# Patient Record
(demographics unavailable — no encounter records)

---

## 2020-07-13 NOTE — NUR
DUANE WARD admitted to room 407-1, with an admitting diagnosis of CELLULITUS/HYPOXIA 
, on 07/13/20 from AM via BED, accompanied by STAFF. DUANE WARD introduced to 
surroundings, call light, bed controls, phone, TV, temperature control, lights, meal times, 
smoking policy, visitor policy, side rail policy, bathrooms and showers.  Patient Rights 
given to patient in the handbook. DUANE WARD verbalizes understanding that [facility] is not responsible for the loss or damage to any personal effects or valuables that 
are kept in the patients posession during their hospitalization.  DUANE WARD 
verbalizes understanding of Interdisciplinary Patient Education. Patient and/or family were 
informed about the Rapid Response Team and its purpose.

## 2020-07-13 NOTE — ED RESPIRATORY
General


Chief Complaint:  Respiratory Problems


Stated Complaint:  SOA


Source:  patient, EMS, nursing home records


Exam Limitations:  no limitations





History of Present Illness


Date Seen by Provider:  2020


Time Seen by Provider:  05:57


Initial Comments


Patient arrives to ER by EMS from [facility], [place] with chief 

complaint of decreased oxygen saturations noted on morning vital signs. She was 

not started feeling short of breath having any cough fevers chills but said she 

felt a little foggy this morning. She does not wear oxygen routinely. On room 

air she was 87 percent and on 6 L nasal cannula per EMS she was 100% she does 

not have any known pulmonary disease. 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 

in pulmonary embolism for which she takes Coumadin now. She has noticed a lot of

increased swelling in bilateral legs all the way up into her hips. She says last

week before the swelling she weighed 444 pounds. She has not been weighed this 

week. She was in the rehabilitation unit for physical therapy after a right hip 

fracture. She started having problems with her knee and so they scheduled her 

for an MRI but because of global pandemic restrictions she was unable to get the

MRI and has been in a holding pattern since. For the past few weeks she's been 

having some red rash on bilateral lower extremities and swelling that has 

steadily gotten worse. She is on her second course of antibiotics. She has been 

on Cipro for 2 days. A few days ago she noticed some blisters and weeping from 

her legs and this was cultured showing MSSA pansensitive. She's had no sick 

contacts. Her bowels are regular and she has no dysuria. She says she has no 

known heart disease but she did have a history of kidney failure years ago.





Allergies and Home Medications


Allergies


Coded Allergies:  


     diphenhydramine (Verified  Allergy, Unknown, 20)


   


   RESTLESS LEGSSD





Home Medications


Pantoprazole Sodium 40 Mg , 40 MG PO DAILY, (Reported)


Warfarin Sodium 4 Mg Tablet, Unknown Dose PO DAILY, (Reported)





Patient Home Medication List


Home Medication List Reviewed:  Yes





Review of Systems


Review of Systems


Constitutional:  No chills, No fever, No malaise, No weakness


EENTM:  No ear discharge, No ear pain


Respiratory:  No cough, No phlegm; short of breath; No wheezing


Cardiovascular:  No chest pain; edema; No Hx of Intervention, No vascular heart 

diseas


Gastrointestinal:  No abdominal pain, No nausea, No vomiting


Genitourinary:  No discharge, No dysuria


Musculoskeletal:  No back pain, No joint pain


Skin:  see HPI, rash


Psychiatric/Neurological:  Denies Headache, Denies Numbness





All Other Systems Reviewed


Negative Unless Noted:  Yes





Past Medical-Social-Family Hx


Patient Social History


Alcohol Use:  Denies Use


Recreational Drug Use:  No


Smoking Status:  Former Smoker


Type Used:  Cigarettes


2nd Hand Smoke Exposure:  No


Recent Hopitalizations:  No


Physical Abuse:  No


Sexual Abuse:  No


Mistreated:  No


Fear:  No





Immunizations Up To Date


Tetanus Booster (TDap):  Unknown





Seasonal Allergies


Seasonal Allergies:  No





Past Medical History


Surgeries:  Yes


Appendectomy,  Section, Gallbladder, Orthopedic


Respiratory:  Yes


Sleep Apnea


Cardiac:  No


Neurological:  Yes


Neuropathy


Genitourinary:  No


Gastrointestinal:  Yes


Gastroesophageal Reflux


Musculoskeletal:  Yes


Fibromyalgia


Endocrine:  Yes


Diabetes, Non-Insulin dep


HEENT:  No


Cancer:  No


Psychosocial:  No


Integumentary:  Yes


Recent Skin Changes


Blood Disorders:  No





Physical Exam





Vital Signs - First Documented








 20





 06:12


 


Temp 36.5


 


Pulse 79


 


Resp 20


 


B/P (MAP) 144/70 (94)


 


Pulse Ox 96


 


O2 Delivery Nasal Cannula


 


O2 Flow Rate 4.00





Capillary Refill :


Height: '"


Weight: lbs. oz. kg;  BMI


Method:


General Appearance:  mild distress, obese (morbid)


Eyes:  Bilateral Eye Normal Inspection, Bilateral Eye PERRL, Bilateral Eye EOMI


HEENT:  PERRL/EOMI, pharynx normal


Neck:  full range of motion, normal inspection


Respiratory:  lungs clear, no accessory muscle use, respiratory distress (mild 

with respiratory rate of 20 and oxygen sats 89% on room air. Oxygen sats 96% on 

4L), decreased breath sounds (secondary to habitus)


Cardiovascular:  normal peripheral pulses, regular rate, rhythm (76)


Gastrointestinal:  normal bowel sounds, non tender, soft


Extremities:  normal capillary refill, pedal edema (bilateral erythematous edema

from the feet up to the hips with some shallow ulcers and A modest amount of 

weeping edema)


Neurologic/Psychiatric:  alert, normal mood/affect, oriented x 3


Skin:  other (lower extremities are blanchable erythema with a few bullae that 

have turned into shallow ulcers bilaterally.)





Progress/Results/Core Measures


Suspected Sepsis


SIRS


Temperature: 


Pulse:  


Respiratory Rate: 


 


Laboratory Tests


20 06:40: White Blood Count 7.3


Blood Pressure  / 


Mean: 


 





Laboratory Tests


20 06:40: 


Creatinine 1.55H, INR Comment 2.4H, Platelet Count 364, Total Bilirubin 0.3








Results/Orders


Lab Results





Laboratory Tests








Test


 20


06:40 20


07:00 Range/Units


 


 


White Blood Count


 7.3 


 


 4.3-11.0


10^3/uL


 


Red Blood Count


 3.75 L


 


 4.35-5.85


10^6/uL


 


Hemoglobin 10.8 L  11.5-16.0  G/DL


 


Hematocrit 35   35-52  %


 


Mean Corpuscular Volume 93   80-99  FL


 


Mean Corpuscular Hemoglobin 29   25-34  PG


 


Mean Corpuscular Hemoglobin


Concent 31 L


 


 32-36  G/DL





 


Red Cell Distribution Width 18.7 H  10.0-14.5  %


 


Platelet Count


 364 


 


 130-400


10^3/uL


 


Mean Platelet Volume 9.7   7.4-10.4  FL


 


Neutrophils (%) (Auto) 67   42-75  %


 


Lymphocytes (%) (Auto) 16   12-44  %


 


Monocytes (%) (Auto) 8   0-12  %


 


Eosinophils (%) (Auto) 9   0-10  %


 


Basophils (%) (Auto) 0   0-10  %


 


Neutrophils # (Auto) 4.9   1.8-7.8  X 10^3


 


Lymphocytes # (Auto) 1.2   1.0-4.0  X 10^3


 


Monocytes # (Auto) 0.6   0.0-1.0  X 10^3


 


Eosinophils # (Auto)


 0.6 H


 


 0.0-0.3


10^3/uL


 


Basophils # (Auto)


 0.0 


 


 0.0-0.1


10^3/uL


 


Prothrombin Time 26.4 H  12.2-14.7  SEC


 


INR Comment 2.4 H  0.8-1.4  


 


Activated Partial


Thromboplast Time 41 H


 


 24-35  SEC





 


Sodium Level 139   135-145  MMOL/L


 


Potassium Level 3.6   3.6-5.0  MMOL/L


 


Chloride Level 96 L    MMOL/L


 


Carbon Dioxide Level 29   21-32  MMOL/L


 


Anion Gap 14   5-14  MMOL/L


 


Blood Urea Nitrogen 18   7-18  MG/DL


 


Creatinine


 1.55 H


 


 0.60-1.30


MG/DL


 


Estimat Glomerular Filtration


Rate 35 


 


  





 


BUN/Creatinine Ratio 12    


 


Glucose Level 146 H    MG/DL


 


Calcium Level 9.2   8.5-10.1  MG/DL


 


Corrected Calcium 9.6   8.5-10.1  MG/DL


 


Total Bilirubin 0.3   0.1-1.0  MG/DL


 


Aspartate Amino Transf


(AST/SGOT) 33 


 


 5-34  U/L





 


Alanine Aminotransferase


(ALT/SGPT) 19 


 


 0-55  U/L





 


Alkaline Phosphatase 68     U/L


 


Troponin I < 0.028   <0.028  NG/ML


 


B-Type Natriuretic Peptide 52.5   <100.0  PG/ML


 


Total Protein 8.2   6.4-8.2  GM/DL


 


Albumin 3.5   3.2-4.5  GM/DL


 


Blood Gas Puncture Site  RT RAD   


 


Blood Gas Patient Temperature  97.7   


 


Arterial Blood pH  7.41  7.37-7.43  


 


Arterial Blood Partial


Pressure CO2 


 55 H


 35-45  MMHG





 


Arterial Blood Partial


Pressure O2 


 76 L


 79-93  MMHG





 


Arterial Blood HCO3  34 H 23-27  MMOL/L


 


Arterial Blood Total CO2


 


 36.1 H


 21.0-31.0


MMOL/L


 


Arterial Blood Oxygen


Saturation 


 95 


   %





 


Arterial Blood Base Excess


 


 9.5 H


 -2.5-2.5


MMOL/L


 


Collin Test  YES-POS   


 


Blood Gas Ventilator Setting  NO   


 


Blood Gas Inspired Oxygen  4 L   








My Orders





Orders - SETH,SHAJI J


Chest 1 View, Ap/Pa Only (20 06:10)


Cbc With Automated Diff (20 06:10)


Comprehensive Metabolic Panel (20 06:10)


BNP (20 06:10)


Protime With Inr (20 06:10)


Continuous Ekg Monitoring (20 06:10)


Ekg Tracing (20 06:10)


Troponin I (20 06:10)


Partial Thromboplastin Time (20 06:10)


Blood Culture (20 06:10)


Cefepime Injection (Maxipime Injection) (20 06:30)


Vancomycin Injection (Vancomycin Injecti (20 06:28)


Pharmacy To Dose (Pharmacy To Dose) (20 06:30)


Arterial Blood Gas (20 07:00)





Medications Given in ED





Current Medications








 Medications  Dose


 Ordered  Sig/Mildred


 Route  Start Time


 Stop Time Status Last Admin


Dose Admin


 


 Cefepime HCl 1000


 mg/Sterile Water  10 ml @ 


 200 mls/hr  ONCE  ONCE


 IV  20 06:30


 20 06:32 DC 20 07:02


200 MLS/HR


 


 Miscellaneous  PHARMACY


 TO DOSE


 VANCOMY...  ONCE  ONCE


 IV  20 06:30


 20 06:32 DC 20 07:25


1 EA


 


 Vancomycin HCl


 2000 mg/Sodium


 Chloride  500 ml @ 


 260 mls/hr  0628  ONCE


 IV  20 06:28


 20 08:23 DC 20 07:25


260 MLS/HR








Vital Signs/I&O











 20





 06:12


 


Temp 36.5


 


Pulse 79


 


Resp 20


 


B/P (MAP) 144/70 (94)


 


Pulse Ox 96


 


O2 Delivery Nasal Cannula


 


O2 Flow Rate 4.00





Capillary Refill :


Progress Note :  


   Time:  06:26


Progress Note


Hypoxia from? If Her PT-INR is not sufficient it could be a pulmonary embolism 

however she is not tachycardic nor having any chest pain. Could be fluid 

overload related to kidney disease or her MSSA bilateral lower extremity 

cellulitis. Plann to obtain blood cultures. ABG, EKG, troponin. She is on her 

second course of outpatient antibiotics so we can cover her with cefepime and 

vancomycin.





ECG


Initial ECG Impression Date:  2020


Initial ECG Impression Time:  06:27


Initial ECG Rate:  79


Initial ECG Rhythm:  Normal Sinus


Initial ECG Intervals:  QT (558)


Initial ECG Impression:  Normal


Comment


Normal sinus rhythm without clinically relevant ST changes.





Diagnostic Imaging





   Diagonstic Imaging:  Xray


   Plain Films/CT/US/NM/MRI:  chest


Comments


NAME:   AMBROCIO WARD


[facility] REC#:   [id number]


ACCOUNT#:   [id number]


PT STATUS:   REG ER


:   1967


PHYSICIAN:   SHAJI ANN MD


ADMIT DATE:   20/ER


                                  ***Signed***


Date of Exam:20





CHEST 1 VIEW, AP/PA ONLY








HISTORY: Shortness of air.





COMPARISON: None





TECHNIQUE: Frontal view of the chest.





FINDINGS:





Evaluation appears somewhat suboptimal due to positioning. There


is elevation of the right hemidiaphragm and low lung volumes.


There is mild cardiomegaly. There are diffuse interstitial


opacities with perihilar prominence. There is airspace opacity at


the left lung base. No significant pleural effusion or


pneumothorax is seen.





IMPRESSION:


1. Suboptimal examination due to patient positioning. There is


cardiomegaly with interstitial opacities and perihilar


predominance, may be due to edema or infection. Airspace opacity


at the left lung base may be due to infection as well.





Dictated by: 





  Dictated on workstation # [id number]








Dict:   2032


Trans:   2048


[facility] [id number]





Interpreted by:     SRIDHAR EUGENE MD


Electronically signed by: SRIDHAR EUGENE MD [id number]


   Reviewed:  Reviewed by Me





Departure


Communication (Admissions)


Time/Spoke to Admitting Phy:  09:50


Discussed the case with Dr. Velasquez and she agrees with antibiotic selection and 

placed on the floor inpatient for oxygen and an IV antibiotics.





Impression





   Primary Impression:  


   Cellulitis and abscess of leg


Disposition:  01 HOME, SELF-CARE


Condition:  Stable





Admissions


Decision to Admit Reason:  Admit from ER (General)


Decision to Admit/Date:  2020


Time/Decision to Admit Time:  09:45











SHAJI ANN                 2020 06:23

## 2020-07-13 NOTE — NUR
VANCOMYCIN DOSING



SCR 1.55; ADJ  KG; CRCL ~ 78; DOSED VANC 2 GM Q12H 

CHECK TROUGH LEVEL BEFORE 4TH DOSE 7/14 1830

HOLD DOSE AND CONTACT PHARMACY IF LEVEL IS GREATER THAN 20 OR LESS THAN 10

## 2020-07-13 NOTE — HISTORY & PHYSICAL-HOSPITALIST
History of Present Illness


HPI/Chief Complaint


Pt is a 53yoCF with a PMH of femur fracture in 2019 s/p repair, 

hypothyroidism, chronic pain, history of DVT who presented to the ER due to 

hypoxia. Reportedly her oxygen saturation was 87% on morning vital checks. She 

was not coughing or short of breath when this was done but now she states she is

somewhat SOB. Her biggest concern has been her  legs. She states 1 week ago she 

developed a "rash" on both of her legs that was cultured and she was informed 

today that it was MSSA. She was treated with oral abx at the NH but it did not 

improve her cellulitis. She is being admitted for failing outpatient management.


Source:  patient


Date Seen


20


Time Seen by a Provider:  15:48


Attending Physician


Robert Velasquez MD


PCP


Vikram Wolf DO


Referring Physician





Date of Admission


2020 at 10:01





Home Medications & Allergies


Home Medications


Reviewed patient Home Medication Reconciliation performed by pharmacy medication

reconciliations technician and/or nursing.


Patients Allergies have been reviewed.





Allergies





Allergies


Coded Allergies


  diphenhydramine (Verified Allergy, Unknown, 20)


    RESTLESS LEGSSD








Past Medical-Social-Family Hx


Past Med/Social Hx:  Reviewed Nursing Past Med/Soc Hx


Patient Social History


Alcohol Use:  Denies Use


Recreational Drug Use:  No


Smoking Status:  Former Smoker


Type Used:  Cigarettes


2nd Hand Smoke Exposure:  No


Recent Foreign Travel:  No


Contact w/other who traveled:  No


Recent Hopitalizations:  No


Recent Infectious Disease Expo:  No





Immunizations Up To Date


Tetanus Booster (TDap):  Unknown


Date of Pneumonia Vaccine:  2020





Seasonal Allergies


Seasonal Allergies:  No





Past Medical History


Surgeries:  Appendectomy,  Section, Gallbladder, Orthopedic


Neurological:  Neuropathy


Pregnant:  No


Gastrointestinal:  Gastroesophageal Reflux


Musculoskeletal:  Fibromyalgia


Endocrine:  Diabetes, Non-Insulin dep


Skin/Integumentary:  Recent Skin Changes


History of Blood Disorders:  No





Family History


Reviewed Nursing Family Hx





Review of Systems


Constitutional:  No chills, No fever


EENTM:  no symptoms reported


Respiratory:  No cough; short of breath


Cardiovascular:  No chest pain; edema


Gastrointestinal:  No abdominal pain, No nausea, No vomiting


Genitourinary:  hesitancy


Musculoskeletal:  no symptoms reported


Skin:  see HPI


Psychiatric/Neurological:  No Symptoms Reported





Physical Exam


Physical Exam


Vital Signs





Vital Signs - First Documented








 20





 06:12 12:24


 


Temp 36.5 


 


Pulse 79 


 


Resp 20 


 


B/P (MAP) 144/70 (94) 


 


Pulse Ox 96 


 


O2 Delivery Nasal Cannula 


 


O2 Flow Rate 4.00 


 


FiO2  36





Capillary Refill : Less Than 3 SecondsLess Than 3 Seconds


Height, Weight, BMI


Height: '"


Weight: lbs. oz. kg; 66.82 BMI


Method:


General Appearance:  No Apparent Distress, Chronically ill, Obese


HEENT:  PERRL/EOMI, Moist Mucous Membranes


Neck:  Normal Inspection, Supple


Respiratory:  Lungs Clear, No Accessory Muscle Use, Other


Cardiovascular:  Regular Rate, Rhythm, No JVD, No Murmur


Gastrointestinal:  Normal Bowel Sounds, Non Tender, Soft


Extremity:  Other (signigicant lymphedema with weeping venous stasis ulcers on 

bilateral legs, both are quite erythematous and warm to touch)


Neurologic/Psychiatric:  Alert, Oriented x3, Normal Mood/Affect





Results


Results/Procedures


Labs


Laboratory Tests


20 05:28








Patient resulted labs reviewed.


Imaging


                 [facility] [place], [place]





NAME:   AMBROCIO WARD


[facility] REC#:   [id number]


ACCOUNT#:   [id number]


PT STATUS:   REG ER


:   1967


PHYSICIAN:   SHAJI ANN MD


ADMIT DATE:   20/ER


                                  ***Signed***


Date of Exam:20





CHEST 1 VIEW, AP/PA ONLY








HISTORY: Shortness of air.





COMPARISON: None





TECHNIQUE: Frontal view of the chest.





FINDINGS:





Evaluation appears somewhat suboptimal due to positioning. There


is elevation of the right hemidiaphragm and low lung volumes.


There is mild cardiomegaly. There are diffuse interstitial


opacities with perihilar prominence. There is airspace opacity at


the left lung base. No significant pleural effusion or


pneumothorax is seen.





IMPRESSION:


1. Suboptimal examination due to patient positioning. There is


cardiomegaly with interstitial opacities and perihilar


predominance, may be due to edema or infection. Airspace opacity


at the left lung base may be due to infection as well.





Dictated by: 





  Dictated on workstation # [id number]








Dict:   20 0732


Trans:   20 0848


[facility] [id number]





Interpreted by:     SRIDHAR EUGENE MD


Electronically signed by: SRIDHAR EUGENE MD 20 0848





Assessment/Plan


Admission Diagnosis


Cellulitis


Admission Status:  Inpatient Order (span 2 midnights)


Reason for Inpatient Admission:  


failed outpatient management





Assessment and Plan


Cellulitis


Lymphedema


Morbid Obesity


   Continue on Vanc and Cefepime for now


   Wound care consulted, appreciate recs


   Advised leg elevation


   Would likely benefit from lymphedema management with OT as an outpatient





Hypoxia


   ?PNA on CXR


   Continue abx


   New onset, ?obesity hypoventilation syndrome 


   Has history of DVT but on therapeutic warfarin and INR within goal


   Consider CTA if creatine improves tomorrow





Elevated creatinine


   Unsure of baseline


   Continue IVF


   Hold diuretics





Hypothyroidism


   Continue home med





Diagnosis/Problems


Diagnosis/Problems





(1) Lymphedema


Status:  Chronic


(2) Morbid obesity


(3) Hypoxia


Status:  Acute


(4) Hypothyroidism


Qualifiers:  


   Hypothyroidism type:  unspecified  Qualified Codes:  E03.9 - Hypothyroidism, 

unspecified


(5) Cellulitis and abscess of leg


Status:  Acute


(6) ERIC (acute kidney injury)


(7) Long term current use of anticoagulants with INR goal of 2.0-3.0





Clinical Quality Measures


DVT/VTE Risk/Contraindication:


Risk Factor Score Per Nursin


RFS Level Per Nursing on Admit:  4+=Very High











ROBERT VELASQUEZ MD              2020 16:25

## 2020-07-13 NOTE — NUR
Duane Ward admitted to room 407-1, with an admitting diagnosis of Cellultis Bilateral 
LE, on 07/13/20 from AM via , accompanied by .DUANE WARD introduced to surroundings, 
call light, bed controls, phone, TV, temperature control, lights, meal times, smoking 
policy, visitor policy, side rail policy, bathrooms and showers.  Patient Rights given to 
patient in the handbook.DUANE WARD verbalizes understanding that [facility] is not 
responsible for the loss or damage to any personal effects or valuables that are kept in the 
patients posession during their hospitalization. DUANE WARD verbalizes understanding 
of Interdisciplinary Patient Education. Patient and/or family were informed about the Rapid 
Response Team and its purpose.

## 2020-07-13 NOTE — WOUND CARE ASSESSMENT
Wound Care Assessment


Date Seen by Provider:  Jul 13, 2020


Time Seen by Provider:  17:50


Chief Complaint


Swelling and inflammation of legs.


HPI


The patient is a 53 year old female with bilateral lymphedema, calf cellulitis, 

and bilateral calf ulcerations.  The patient suffers with severe obesity (BMI = 

67), lymphedema and decreased mobility.  Unable to elevate actively, but states 

she would be willing to try to allow the nursing staff to elevate her legs.  

Drainage is currently controlled with chucks under her legs.  Would avoid wound 

dressings as these will become saturated with drainage and cause maceration.  

Elevation of ankles above heart discussed and recommended.  Long term her 

condition would be helped with weight loss.  I am uncertain if she would be a 

candidate for gastric restriction operation. Have little else to offer at this 

time.


Past Medical History:  Admits Diabetes Type II


Smoking Status:  Former Smoker


Recreational Drug Use:  No


Alcohol Use:  Denies Use


Review of Systems


Pulmonary:  No Dyspnea


Cardiovascular:  No: Chest Pain





Exam





Vital Signs








  Date Time  Temp Pulse Resp B/P (MAP) Pulse Ox O2 Delivery O2 Flow Rate FiO2


 


7/13/20 16:34 36.6 76 20 152/74 (100) 95 Nasal Cannula 3.00 


 


7/13/20 12:24        36





Capillary Refill : Less Than 3 SecondsLess Than 3 Seconds


General Appearance:  no apparent distress


Respiratory:  no respiratory distress


Extremities:  other (Multiple bilateral leg ulcers.)





Results


Laboratory Tests


7/13/20 06:40: 


White Blood Count 7.3, Red Blood Count 3.75L, Hemoglobin 10.8L, Hematocrit 35, 

Mean Corpuscular Volume 93, Mean Corpuscular Hemoglobin 29, Mean Corpuscular 

Hemoglobin Concent 31L, Red Cell Distribution Width 18.7H, Platelet Count 364, 

Mean Platelet Volume 9.7, Neutrophils (%) (Auto) 67, Lymphocytes (%) (Auto) 16, 

Monocytes (%) (Auto) 8, Eosinophils (%) (Auto) 9, Basophils (%) (Auto) 0, 

Neutrophils # (Auto) 4.9, Lymphocytes # (Auto) 1.2, Monocytes # (Auto) 0.6, 

Eosinophils # (Auto) 0.6H, Basophils # (Auto) 0.0, Prothrombin Time 26.4H, INR 

Comment 2.4H, Activated Partial Thromboplast Time 41H, Sodium Level 139, 

Potassium Level 3.6, Chloride Level 96L, Carbon Dioxide Level 29, Anion Gap 14, 

Blood Urea Nitrogen 18, Creatinine 1.55H, Estimat Glomerular Filtration Rate 35,

BUN/Creatinine Ratio 12, Glucose Level 146H, Calcium Level 9.2, Corrected 

Calcium 9.6, Total Bilirubin 0.3, Aspartate Amino Transf (AST/SGOT) 33, Alanine 

Aminotransferase (ALT/SGPT) 19, Alkaline Phosphatase 68, Troponin I < 0.028, B-

Type Natriuretic Peptide 52.5, Total Protein 8.2, Albumin 3.5


7/13/20 07:00: 


Blood Gas Puncture Site RT RAD, Blood Gas Patient Temperature 97.7, Arterial 

Blood pH 7.41, Arterial Blood Partial Pressure CO2 55H, Arterial Blood Partial 

Pressure O2 76L, Arterial Blood HCO3 34H, Arterial Blood Total CO2 36.1H, 

Arterial Blood Oxygen Saturation 95, Arterial Blood Base Excess 9.5H, Collin Test

YES-POS, Blood Gas Ventilator Setting NO, Blood Gas Inspired Oxygen 4 L


7/13/20 11:19: Glucometer 100


7/13/20 16:29: Glucometer 110





Assessment/Plan/Dx


1. Bilateral lymphedema, with cellulitis and bilateral ulcers.





2. Morbid obesity, severe, BMI = 67.





3. Diabetes with leg ulcers.





4. Dysmobility.





Plan:  This kind of ulceration is very difficult to treat, as the lymphedema can

be intractable.  If the patient can be coaxed into elevating significantly, she 

may be able to reduce edema, inflammation, and see some healing.   With her BMI 

this is very challenging.  Will see again as needed.











JAVIER MORAN MD             Jul 13, 2020 18:00

## 2020-07-13 NOTE — NUR
I WENT THROUGH THE ORDER SUMMARY REPORT FROM [facility] AND CALLED [facility] TO COMPLETE THE MED REC.



WHEN I CALLED THEY TOLD ME THAT THE LAST TIME THAT A FENTANYL PATCH WAS APPLIED WAS 
07/10/2020

## 2020-07-13 NOTE — XMS REPORT
Continuity of Care Document

                             Created on: 2020



AMBROCIO WARD

External Reference #: [id number]

: 1967

Sex: Female



Demographics





                          Home Phone                [phone]

 

                          Preferred Language        Unknown

 

                          Marital Status            Unknown

 

                          Confucianist Affiliation     Unknown

 

                          Race                      Unknown

 

                          Ethnic Group              Unknown





Author





                          Organization              Unknown

 

                          Address                   Unknown

 

                          Phone                     Unavailable



              



Allergies

      



There is no data.                  



Medications

      



There is no data.                  



Problems

      



There is no data.                  



Procedures

      



There is no data.                  



Results

      



                    Test                Result              Range        

 

                                        Complete blood count (CBC) with automate

d white blood cell (WBC) differential - 

20 06:40         

 

                          Blood leukocytes automated count (number/volume)      

     7.3 10*3/uL          

                                        4.3-11.0        

 

                          Blood erythrocytes automated count (number/volume)    

       3.75 10*6/uL       

                                        4.35-5.85        

 

                    Venous blood hemoglobin measurement (mass/volume)           

10.8 g/dL           

11.5-16.0        

 

                    Blood hematocrit (volume fraction)           35 %           

     35-52        

 

                    Automated erythrocyte mean corpuscular volume           93 [

foz_us]           

80-99        

 

                                        Automated erythrocyte mean corpuscular h

emoglobin (mass per erythrocyte)        

                          29 pg                     25-34        

 

                                        Automated erythrocyte mean corpuscular h

emoglobin concentration measurement 

(mass/volume)             31 g/dL                   32-36        

 

                    Automated erythrocyte distribution width ratio           18.

7 %              10.0-

14.5        

 

                    Automated blood platelet count (count/volume)           364 

10*3/uL           

130-400        

 

                          Automated blood platelet mean volume measurement      

     9.7 [foz_us]         

                                        7.4-10.4        

 

                    Automated blood neutrophils/100 leukocytes           67 %   

             42-75       

 

 

                    Automated blood lymphocytes/100 leukocytes           16 %   

             12-44       

 

 

                    Blood monocytes/100 leukocytes           8 %                

 0-12        

 

                    Automated blood eosinophils/100 leukocytes           9 %    

             0-10        

 

                    Automated blood basophils/100 leukocytes           0 %      

           0-10        

 

                    Blood neutrophils automated count (number/volume)           

4.9 10*3            

1.8-7.8        

 

                    Blood lymphocytes automated count (number/volume)           

1.2 10*3            

1.0-4.0        

 

                    Blood monocytes automated count (number/volume)           0.

6 10*3            

0.0-1.0        

 

                    Automated eosinophil count           0.6 10*3/uL           0

.0-0.3        

 

                    Automated blood basophil count (count/volume)           0.0 

10*3/uL           

0.0-0.1        

 

                                        Comprehensive metabolic panel - 20

 06:40         

 

                          Serum or plasma sodium measurement (moles/volume)     

      139 mmol/L          

                                        135-145        

 

                          Serum or plasma potassium measurement (moles/volume)  

         3.6 mmol/L       

                                        3.6-5.0        

 

                          Serum or plasma chloride measurement (moles/volume)   

        96 mmol/L         

                                                

 

                    Carbon dioxide           29 mmol/L           21-32        

 

                          Serum or plasma anion gap determination (moles/volume)

           14 mmol/L      

                                        5-14        

 

                          Serum or plasma urea nitrogen measurement (mass/volume

)           18 mg/dL      

                                        7-18        

 

                          Serum or plasma creatinine measurement (mass/volume)  

         1.55 mg/dL       

                                        0.60-1.30        

 

                    Serum or plasma urea nitrogen/creatinine mass ratio         

  12                  NRG 

       

 

                                        Serum or plasma creatinine measurement w

ith calculation of estimated glomerular 

filtration rate           35                        NRG        

 

                    Serum or plasma glucose measurement (mass/volume)           

146 mg/dL           

        

 

                    Serum or plasma calcium measurement (mass/volume)           

9.2 mg/dL           

8.5-10.1        

 

                          Serum or plasma total bilirubin measurement (mass/volu

me)           0.3 mg/dL   

                                        0.1-1.0        

 

                                        Serum or plasma alkaline phosphatase ambrocio

surement (enzymatic activity/volume)    

                          68 U/L                            

 

                                        Serum or plasma aspartate aminotransfera

se measurement (enzymatic 

activity/volume)           33 U/L                    5-34        

 

                                        Serum or plasma alanine aminotransferase

 measurement (enzymatic activity/volume)

                          19 U/L                    0-55        

 

                    Serum or plasma protein measurement (mass/volume)           

8.2 g/dL            

6.4-8.2        

 

                    Serum or plasma albumin measurement (mass/volume)           

3.5 g/dL            

3.2-4.5        

 

                    CALCIUM CORRECTED           9.6 mg/dL           8.5-10.1    

    

 

                                        PT panel in platelet poor plasma by coag

ulation assay - 20 06:40         

 

                          Prothrombin time (PT) in platelet poor plasma by coagu

lation assay           

26.4 s                                  12.2-14.7        

 

                          INR in platelet poor plasma or blood by coagulation as

say           2.4         

                                        0.8-1.4        

 

                                        Activated partial thromboplastin time (a

PTT) in platelet poor plasma 

bycoagulation assay - 20 06:40         

 

                                        Activated partial thromboplastin time (a

PTT) in platelet poor plasma 

bycoagulation assay           41 s                      24-35        

 

                                        Serum or plasma troponin i.cardiac measu

rement (mass/volume) - 20 06:40   

      

 

                          Serum or plasma troponin i.cardiac measurement (mass/v

olume)           < ng/mL  

                                        <0.028        

 

                                        Serum or plasma lithium measurement (mol

es/volume) - 20 06:40         

 

                    BNP PT              52.5 pg/mL           <100.0        

 

                                        Arterial blood gas measurement - 

0 07:00         

 

                    Blood pCO2           55 mm[Hg]           35-45        

 

                    Blood pO2           76 mm[Hg]           79-93        

 

                          Arterial blood bicarbonate measurement (moles/volume) 

          34 mmol/L       

                                        23-27        

 

                    Arterial blood base excess by calculation           9.5 mmol

/L           

-2.5-2.5        

 

                    Arterial blood oxygen saturation measurement           95 % 

                   

    

 

                    * Inhaled oxygen flow rate           4 L                 NRG

        

 

                          Arterial blood pH measurement with patient temperature

 correction           7.41

                                        7.37-7.43        

 

                          Arterial blood carbon dioxide, total measurement (mole

s/volume)           36.1 

mmol/L                                  21.0-31.0        

 

                    Body site           RT RAD              NRG        

 

                          Assessment of wrist artery patency prior to arterial p

uncture           YES-POS 

                                        NRG        

 

                    Setting of ventilation mode           NO                  NR

G        

 

                    Measurement of body temperature           97.7              

  NRG        



                            



Encounters

      



                ACCT No.           Visit Date/Time           Discharge          

 Status         

             Pt. Type           Provider           Facility           Loc./Unit 

          

Complaint        

 

             [id number]           2020 06:53:00                         

            

Document Registration

## 2020-07-13 NOTE — NUR
PT RESTING W EYES CLOSED. PT AWAKEN, PT IS ALERT WEARING O2. SL IN PLACE IN R 
AC. NO REQUEST OR CO AT THIS X

## 2020-07-13 NOTE — DIAGNOSTIC IMAGING REPORT
HISTORY: Shortness of air.



COMPARISON: None



TECHNIQUE: Frontal view of the chest.



FINDINGS:



Evaluation appears somewhat suboptimal due to positioning. There

is elevation of the right hemidiaphragm and low lung volumes.

There is mild cardiomegaly. There are diffuse interstitial

opacities with perihilar prominence. There is airspace opacity at

the left lung base. No significant pleural effusion or

pneumothorax is seen.



IMPRESSION:

1. Suboptimal examination due to patient positioning. There is

cardiomegaly with interstitial opacities and perihilar

predominance, may be due to edema or infection. Airspace opacity

at the left lung base may be due to infection as well.



Dictated by: 



  Dictated on workstation # [id number]

## 2020-07-14 NOTE — DIAGNOSTIC IMAGING REPORT
Indication:  Hypoxia 



Comparison:  07/13/2020 



Findings:  Single view of the chest demonstrates cardiac

enlargement with stable central vascular congestion. There is no

pneumothorax or large effusion. Osseous structures are

age-appropriate.



Impression: Cardiac enlargement with central vascular congestion.



Dictated by: 



  Dictated on workstation # [id number]

## 2020-07-14 NOTE — NUR
CM/SS visited with patient for discharge planning. 



Plan: The patient will discharge back to [facility] intermediate. The  
time is set for 1:00 p.m. tomorrow 7/15. 



CM/SS visited with the patient to discuss discharge. The patient verbalized understanding to 
being discharged back tomorrow. She states that she has been living there since January of 
this year. The patient only has Medicaid; therefore, she cannot be skilled at a facility.  
She states that she does not have any friends or family in the area. CM/SS asked if there 
was anyone to call to update on care and patient stated "no".



Will continue to follow for discharge planning.

## 2020-07-14 NOTE — PROGRESS NOTE - HOSPITALIST
Subjective


HPI/CC On Admission


Date Seen by Provider:  2020


Time Seen by Provider:  11:15


Pt is a 53yoCF with a PMH of femur fracture in 2019 s/p repair, 

hypothyroidism, chronic pain, history of DVT who presented to the ER due to 

hypoxia. Reportedly her oxygen saturation was 87% on morning vital checks. She 

was not coughing or short of breath when this was done but now she states she is

somewhat SOB. Her biggest concern has been her  legs. She states 1 week ago she 

developed a "rash" on both of her legs that was cultured and she was informed 

today that it was MSSA. She was treated with oral abx at the NH but it did not 

improve her cellulitis. She is being admitted for failing outpatient management.


Subjective/Events-last exam


Pt reports feeling better today. Breathing improved. Leg pain still present but 

controlled with pain meds.





Objective


Exam


Vital Signs





Vital Signs








  Date Time  Temp Pulse Resp B/P (MAP) Pulse Ox O2 Delivery O2 Flow Rate FiO2


 


20 09:39     94 Nasal Cannula 5.00 


 


20 08:12 36.7 72 20 113/60 (77)    


 


20 12:24        36





Capillary Refill : Less Than 3 SecondsLess Than 3 Seconds


General Appearance:  Chronically ill, Obese


Respiratory:  Lungs Clear, No Accessory Muscle Use


Cardiovascular:  Regular Rate, Rhythm, No Murmur


Extremity:  Swelling, Other (erythema improving, still with weeping sores and l

ymphedema)


Neurologic/Psychiatric:  Alert, Oriented x3





Results/Procedures


Lab


Laboratory Tests


20 05:28








Patient resulted labs reviewed.





Assessment/Plan


Assessment and Plan


Assess & Plan/Chief Complaint


Cellulitis


Lymphedema


Morbid Obesity


   Continue on Vanc and Cefepime for now


   Wound care consulted, appreciate recs


   Advised leg elevation


   Would likely benefit from lymphedema management with OT as an outpatient





Hypoxia


   ?PNA on CXR


   Continue abx


   Has history of DVT but on therapeutic warfarin and INR within goal


   CXR today shows some vascular congestion


   Echo ordered





ERIC, resolved


   Creatinine improved


   DC fluids





Hypothyroidism


   Continue home med





Diagnosis/Problems


Diagnosis/Problems





(1) Lymphedema


Status:  Chronic


(2) Morbid obesity


(3) Hypoxia


Status:  Acute


(4) Hypothyroidism


Qualifiers:  


   Hypothyroidism type:  unspecified  Qualified Codes:  E03.9 - Hypothyroidism, 

unspecified


(5) Cellulitis and abscess of leg


Status:  Acute


(6) ERIC (acute kidney injury)


(7) Long term current use of anticoagulants with INR goal of 2.0-3.0





Clinical Quality Measures


DVT/VTE Risk/Contraindication:


Risk Factor Score Per Nursin


RFS Level Per Nursing on Admit:  4+=Very High











ROBERT VILLALTA MD              2020 11:21

## 2020-07-14 NOTE — PHYSICAL THERAPY EVALUATION
PT Evaluation-General


Medical Diagnosis


Admission Date


Jul 13, 2020 at 10:01


Medical Diagnosis:  cellulitis/hypoxia


Onset Date:  Jul 13, 2020





Therapy Diagnosis


Therapy Diagnosis:  impaired mobility





Precautions


Precautions/Isolations:  Fall Prevention, Standard Precautions





Weight Bear Status


Right Lower Extremity:  Right


Weight Bearing/Tolerated


Left Lower Extremity:  Left


Weight Bearing/Tolerated





Referral


Physician:  Ron


Reason for Referral:  Evaluation/Treatment





Medical History


Pertinent Medical History:  DM, Hypothroidism, Neuropathy


Additional Medical History


morbid obesity/PE's/DVT's/right femur fracture October 2019


Current History


EMS from NH secondary to decreased SAO2


Reviewed History:  Yes





Social History


Home:  Nursing Home





Prior


Prior Level of Function


SCALE: Activities may be completed with or without assistive devices.





6-Indepedent-patient completes the activity by him/herself with no assistance 

from a helper.


5-Set-up or Clean-up Assistance-helper sets up or cleans up; patient completes 

activity. [place] assists only prior to or  


    following the activity.


4-Supervision or Touching Assistance-helper provides verbal cues and/or 

touching/steadying and/or contact guard assistance as patient completes 

activity. Assistance may be provided   


    throughout the activity or intermittently.


3-Partial/Moderate Assistance-helper does LESS THAN HALF the effort. [place] 

lifts, holds or supports trunk or limbs, but provides less than half the effort.


2-Substantial/Maximal Assistance-helper does MORE THAN HALF the effort. [place] 

lifts or holds trunk or limbs and provides more than half the effort.


5-Hskqrwkcc-tjusyl does ALL the effort. Patient does none of the effort to 

complete the activity. Or, the assistance of 2 or more helpers is required for 

the patient to complete the  


    activity.


If activity was not attempted, code reason:


7-Patient Refused.


9-Not Applicable-not attempted and the patient did not perform the activity 

before the current illness, exacerbation or injury.


10-Not Attempted due to Environmental Limitations-(lack of equipment, weather 

restraints, etc.).


88-Not Attempted due to Medical Conditions or Safety Concerns.


Bed Mobility:  3


Transfers (B,C,W/C):  5


Gait:  4


Stairs:  9


Indoor Mobility (Ambulation):  Needed Some Help


Stairs:  Not [place]


Prior Devices Use:  Manual wheelchair, Walker





PT Evaluation-Current


Subjective


Patient agrees to PT.  C/o bilateral LE pain due to cellulitis.





Pain





   Numeric Pain Scale:  8


   Location:  Right, Left


   Location Body Site:  [place]


   Pain Description:  Chronic





Objective


Patient Orientation:  Normal For Age


Attachments:  Oxygen, Ruffin Catheter, IV





ROM/Strength


ROM Lower Extremities


limited bilaterally due to morbid obesity


Strength Lower Extremities


3/5 grossly bilateral





Integumentary/Posture


Integumentary


bilateral distal wounds/cellulitis


Bowel Incontinence:  Yes


Bladder Incontinence:  Ruffin Cath


Posture


trunk flexed posture





Neuromuscular


(Tone, Coordination, Reflexes)


grossly intact





Sensory


Vision:  Functional


Hearing:  Functional


Sensation Right Lower Extremit:  Intact


Sensation Left Lower Extremity:  Intact





Transfers


Roll Left to Right (QC):  1


Sit to Lying (QC):  1


Lying to Sitting/Side of Bed(Q:  1


Sit to Stand (QC):  5


patient requires more assistance with bilateral LE movement to attain supine and

sit EOB/repositioned patient up in bed with bed in Trendelenburg position





Gait


Does the Patient Walk?:  Yes


Mode of Locomotion:  Both


Anticipated Mode of Locomotion:  Both


Distance:  5'


Gait Assistive Device:  FWW


Comments/Gait Description


SBA with sidestepping up toward HOB





Balance


Sitting Static:  Normal


Sitting Dynamic:  Normal


Standing Static:  Fair


 Standing Dynamic:  Fair





Assessment/Needs


53 y.o. female, will be seen short term by skilled PT to address functional 

strength and mobility to  improve current LOF.


Rehab Potential:  Fair





PT Long Term Goals


Long Term Goals


PT Long Term Goals Time Frame:  Jul 25, 2020


Roll Left & Right (QC):  4


Sit to Lying (QC):  4


Lying-Sitting on Side/Bed(QC):  4


Sit to Stand (QC):  5


Chair/Bed-to-Chair Xfer(QC):  5


Toilet Transfer (QC):  5


Does the Patient Walk:  Yes


Walk 10 feet (QC):  5





PT Plan


Problem List


Problem List:  Activity Tolerance, Functional Strength, Gait, Bed Mobility, ROM





Treatment/Plan


Treatment Plan:  Continue Plan of Care


Treatment Plan:  Bed Mobility, Education, Functional Activity [place], Functional 

Strength, Gait, Safety, Therapeutic Exercise, Transfers


Treatment Duration:  Jul 25, 2020


Frequency:  6 times per week


Estimated Hrs Per Day:  .25 hour per day


Patient and/or Family Agrees t:  Yes





Time/GCodes


Time In:  1310


Time Out:  1341


Total Billed Treatment Time:  31


Total Billed Treatment


1 visit


EVModC 15 min


FA 16 min











JAKE FIERRO PT              Jul 14, 2020 14:03

## 2020-07-14 NOTE — OCCUPATIONAL THERAPY EVAL
OT Evaluation-General/PLF


Medical Diagnosis


Admission Date


Jul 13, 2020 at 10:01


Medical Diagnosis:  cellulitis/hypoxia


Onset Date:  Jul 13, 2020





Therapy Diagnosis


Therapy Diagnosis:  decreased ADL status





Precautions


Precautions/Isolations:  Fall Prevention, Standard Precautions





Weight Bear Status


Weight Bearing Restriction:  Weight Bearing/Tolerated


Location Restriction:  LE Bilateral





Referral


Physician:  Ron


Referral Reason:  Evaluation/Treatment





Medical History


Pertinent Medical History:  DM, Hypothroidism, Neuropathy


Additional Medical History


femur fx (October 2019) s/p repair, chronic pain, morbid obesity, DVT


Current History


ED secondary to hypoxia





Social History


Home:  Nursing Home





ADL-Prior Level of Function


SCALE: Activities may be completed with or without assistive devices.





6-Indepedent-patient completes the activity by him/herself with no assistance fr

om a helper.


5-Set-up or Clean-up Assistance-helper sets up or cleans up; patient completes 

activity. Browning assists only prior to or  


    following the activity.


4-Supervision or Touching Assistance-helper provides verbal cues and/or 

touching/steadying and/or contact guard assistance as patient completes 

activity. Assistance may be provided   


    throughout the activity or intermittently.


3-Partial/Moderate Assistance-helper does LESS THAN HALF the effort. Browning 

lifts, holds or supports trunk or limbs, but provides less than half the effort.


2-Substantial/Maximal Assistance-helper does MORE THAN HALF the effort. Browning 

lifts or holds trunk or limbs and provides more than half the effort.


1-Tqvortkhw-ewxkra does ALL the effort. Patient does none of the effort to 

complete the activity. Or, the assistance of 2 or more helpers is required for 

the patient to complete the  


    activity.


If activity was not attempted, code reason:


7-Patient Refused.


9-Not Applicable-not attempted and the patient did not perform the activity 

before the current illness, exacerbation or injury.


10-Not Attempted due to Environmental Limitations-(lack of equipment, weather 

restraints, etc.).


88-Not Attempted due to Medical Conditions or Safety Concerns.


ADL PLOF Comments


Pt reports living at the nursing home since January 2020. She reports she is 

able to get from her bed to the restroom using FWW, but sometimes needs 

assistance with toilet hygiene. She was able to complete showering and dressing 

by herself at [facility] but has been completing sponge baths lately due to wounds on 

lower legs.


Self Care:  Needed Some Help


Functional Cognition:  Independent


DME/Equipment:  Shower


DME/Equipment Comments


walker





OT Current Status


Subjective


Pt laying in bed, agreeable to OT evaluation/tx on this date. Reports pain in 

BLEs due to cellulitis





Pain





   Numeric Pain Scale:  8





Mental Status/Objective


Patient Orientation:  Person, Place, Time, Situation


Attachments:  Ruffin Catheter, IV, Oxygen





Current


Upper Extremity ROM


WFL, BUE shoulder flexion to approx 150 degrees


Upper Extremity Coordination


WFL


Upper Extremity Strength


grossly 3+/5 MMT





ADL-Treatment


Eating (QC):  6 (Per pt report, she was able to eat lunch without difficulties, 

she is able to cut food and bring food to her mouth)


On/Off Footwear (QC):  1 (Pt unable to don/doff gripper socks )


Toileting Hygiene (QC):  1 (Pt required assistance with wiping buttocks during 

stand at FWW)





Other Treatments


Pt laying in bed, OT educated pt on purpose and benefits of OT, she verbalized 

understanding. Pt then provided information on PLOF, assistance level required 

at the NH, and medical history. Pt reports she was able to eat lunch without 

assistance, no difficulties cutting food and bringing food to mouth. OT assisted

pt with donning gripper socks before transferring to EOB. Pt attempted to scoot 

legs towards EOB, requiring assistance BLE, and some assistance with upper body 

to achieve sitting position. Pt then scooted towards EOB in order to stand at 

FWW. OT assisted pt with using wet wipe to complete hygiene while pt in stand. 

Pt returned to sit EOB, then supine requiring assistance with upper and lower 

body. Pt's bed positioned in Trendelenburg position with assist x2 to scoot 

towards HOB. OT then assisted pt with doffing gripper socks. Post OT Tx, pt 

laying in bed, call light in reach and all needs met.





Education


OT Patient Education:  Correct positioning, Energy conservation, Modified ADL 

techniques, Progress toward Goal/Update tx plan, Purpose of tx/functional 

activities, Transfer techniques


Teaching Recipient:  Patient


Teaching Methods:  Discussion


Response to Teaching:  Verbalize Understanding





OT Long Term Goals


Long Term Goals


Time Frame:  Jul 24, 2020


Oral Hygiene (QC):  6


Toileting Hygiene (QC):  4


Shower/Bathe Self (QC):  3


Upper Body Dressing (QC):  4


Lower Body Dressing (QC):  3


1=Demonstrate adherence to instructed precautions during ADL tasks.


2=Patient will verbalize/demonstrate understanding of assistive 

devices/modifications for ADL.


3=Patient will improve strength/tolerance for activity to enable patient to 

perform ADL's.





OT Education/Plan


Problem List/Assessment


Assessment:  Decreased Activ Tolerance, Decreased UE Strength, Impaired Bed 

Mobility, Impaired I ADL's, Impaired Self-Care Skills





Discharge Recommendations


Plan/Recommendations:  Continue POC


Therapy Discharge Recommendati:  Other, See Comments (NH)





Treatment Plan/Plan of Care


Treatment,Training & Education:  Yes


Patient would benefit from OT for education, treatment and training to promote 

independence in ADL's, mobility, safety and/or upper extremity function for 

ADL's.


Plan of Care:  ADL Retraining, Functional Mobility, UE Funct Exercise/Act


Treatment Duration:  Jul 24, 2020


Frequency:  5 times per week


Estimated Hrs Per Day:  .25 hour per day


Rehab Potential:  Fair





Time/GCodes


Start Time:  13:10


Stop Time:  13:40


Total Time Billed (hr/min):  30


Billed Treatment Time


1, EVM (15'), FA (15')











SAM CHOE OT          Jul 14, 2020 14:18

## 2020-07-15 NOTE — DISCHARGE SUMMARY
Diagnosis/Chief Complaint


Date of Admission


2020 at 10:01


Date of Discharge





Admission Diagnosis


Cellulitis


Primary Care





Discharge Diagnosis





(1) Lymphedema


Status:  Chronic


(2) Morbid obesity


(3) Hypoxia


Status:  Acute


(4) Hypothyroidism


(5) Cellulitis and abscess of leg


Status:  Acute


(6) ERIC (acute kidney injury)


(7) Long term current use of anticoagulants with INR goal of 2.0-3.0





Discharge Summary


Procedures/Consulations


Dr Lama- Wound Care





Discharge Physical Exam


Allergies:  


Coded Allergies:  


     diphenhydramine (Verified  Allergy, Unknown, 20)


   


   RESTLESS LEGSSD


Vitals & I&Os





Vital Signs








  Date Time  Temp Pulse Resp B/P (MAP) Pulse Ox O2 Delivery O2 Flow Rate FiO2


 


7/15/20 07:47 37.0 86 20 114/55 (74) 91 Nasal Cannula 3.00 


 


20 12:24        36








General Appearance:  Chronically ill, Obese


Cardiovascular:  Regular Rate, Rhythm


Gastrointestinal:  Normal Bowel Sounds, Soft


Neurologic/Psychiatric:  Alert, Oriented x3





Hospital Course


Pt was admitted to the hospital for cellulitis after failing outpatient 

treatment. She was started on broad spectrum antibiotics and improved 

significantly. She was found to be mildly hypoxic and was tested for oxygen 

which found ______. She was discharged back to her nursing home on Keflex and 

Bactrim. She was seen by wound care as well. I called and spoke with her PCP Dr Wolf to update him on her hospital course as well.


Labs (last 24 hrs)


Laboratory Tests


20 10:42: Glucometer 128H


20 13:10: Coronavirus (COVID-19)(PCR) Negative


20 15:31: Glucometer 130H


20 18:27: Vancomycin Level Trough 32.0*H


20 20:32: Glucometer 152H


7/15/20 05:50: Glucometer 119H


7/15/20 06:02: Vancomycin Level Trough 21.4H





Microbiology


20 Blood Culture - Preliminary, Resulted


          No growth


Patient resulted labs reviewed.


Pending Labs


Laboratory Tests


7/15/20 05:50: Glucometer 119


7/15/20 06:02: Vancomycin Level Trough 21.4








Discussion & Recommendations


Discharge Planning:  >30 minutes discharge planning





Discharge


Home Medications:





Active Scripts


Active


Reported


Fentanyl Patch 75MCG (Fentanyl) 1 Each Patch.td72 75 Mcg TD Q72H


Protonix (Pantoprazole Sodium) 40 Mg Tablet.dr 40 Mg PO DAILY


Potassium Chloride 10 Meq Tab.er.prt 20 Meq PO DAILY


     TAKE TWO TABLETS


Nystatin 15 Gm Oint...g. 1 Applic TP BID


Endocet 7.5-325 mg Tablet (Oxycodone HCl/Acetaminophen) 1 Each Tablet 1 Each PO 

Q6H PRN


Mirapex (Pramipexole Di-HCl) 1 Mg Tablet 1 Mg PO TID


Metolazone 2.5 Mg Tablet 2.5 Mg PO DAILY


Metformin HCl 500 Mg Tablet 500 Mg PO DAILY


Levothyroxine Sodium 50 Mcg Tablet 50 Mcg PO DAILY


Ibuprofen 200 Mg Capsule 200 Mg PO Q6H PRN


Diclofenac Sodium 100 Gm Gel..gram. 1 Applic TD QID PRN


     APPLY 4 GRAMS TO EACH KNEE NOT TO EXCEED 32 GRAMS PER DAY


Warfarin Sodium 5 Mg Tablet 5 Mg PO SUN MON WED FRI SAT


     TAKE ONE TABLET IN THE EVENING OF EVERY , MONDAY, WEDNESDAY,


     FRIDAY AND SATURDAY FOR DVT


Warfarin Sodium 10 Mg Tablet 10 Mg PO TUESDAY AND THURSDAY


     TAKE ONE TABLET IN THE EVENING OF EVERY TUESDAY AND WEDNESDAY


Baclofen 10 Mg Tablet 10 Mg PO QID


Lasix (Furosemide) 40 Mg Tablet 40 Mg PO DAILY


Cymbalta (Duloxetine HCl) 30 Mg Capsule.dr 30 Mg PO DAILY


Allopurinol 100 Mg Tablet 100 Mg PO BID


Cipro (Ciprofloxacin HCl) 500 Mg Tablet 500 Mg PO BID


     HAS A 14 DAY SUPPLY. FILLED ON 2020.





Instructions to patient/family


Please see electronic discharge instructions given to patient.





Clinical Quality Measures


DVT/VTE Risk/Contraindication:


Risk Factor Score Per Nursin


RFS Level Per Nursing on Admit:  4+=Very High





Copy


Copies To 1:   NYLA WOLF DO





Problem Qualifiers





(1) Hypothyroidism:  


Hypothyroidism type:  unspecified  Qualified Codes:  E03.9 - Hypothyroidism, 

unspecified








ROBERT VILLALTA MD              Jul 15, 2020 09:29

## 2020-07-15 NOTE — NUR
Pt was taken off O2 at rest and dropped O2 sat to 87% after 2 mins. Pt was 

placed back on 3 LNC and O2 returned to 92%. Pt unable to walk at this time.




-------------------------------------------------------------------------------

Addendum: 07/15/20 at 1026 by KRISTINE MURRAY RT

-------------------------------------------------------------------------------

Amended: Links added.

## 2020-07-15 NOTE — NUR
PTD VANCOMYCIN

LABS: SCR 1.17(1.55) VANCOMYCIN LEVELS 7/14 @ 1827- 32; 7/1 @0602 - 21.4

PHARMACOKINETIC CALC. Kd 0.0335 T1/2 ~ 21 HOURS

PLAN: CHANGE VANCOMYCIN TO 2,000MG IV [id number], NEXT DOSE @ 1500. WILL REPEAT LEVEL IN A FEW 
DAYS IF STILL ON THERAPY/IN HOSPITAL.

## 2020-07-15 NOTE — PHYSICAL THERAPY DAILY NOTE
PT Daily Note-Current


Subjective


Pt presents supine in bed, reporting 10/10 pain in B legs. Pt refuses any OOB 

activity due to pain, but agrees to complete bed exercises.





Appearance


Following session, pt is supine in bed, call light and tray within reach. All 

needs met.





Mental Status


Patient Orientation:  Person, Place, Situation


Attachments:  Oxygen, Ruffin Catheter





Transfers


SCALE: Activities may be completed with or without assistive devices.





6-Indepedent-patient completes the activity by him/herself with no assistance 

from a helper.


5-Set-up or Clean-up Assistance-helper sets up or cleans up; patient completes 

activity. Mereta assists only prior to or  


    following the activity.


4-Supervision or Touching Assistance-helper provides verbal cues and/or 

touching/steadying and/or contact guard assistance as patient completes 

activity. Assistance may be provided   


    throughout the activity or intermittently.


3-Partial/Moderate Assistance-helper does LESS THAN HALF the effort. Mereta 

lifts, holds or supports trunk or limbs, but provides less than half the effort.


2-Substantial/Maximal Assistance-helper does MORE THAN HALF the effort. Mereta 

lifts or holds trunk or limbs and provides more than half the effort.


7-Jirquujmh-teavyj does ALL the effort. Patient does none of the effort to 

complete the activity. Or, the assistance of 2 or more helpers is required for 

the patient to complete the  


    activity.


If activity was not attempted, code reason:


7-Patient Refused.


9-Not Applicable-not attempted and the patient did not perform the activity 

before the current illness, exacerbation or injury.


10-Not Attempted due to Environmental Limitations-(lack of equipment, weather 

restraints, etc.).


88-Not Attempted due to Medical Conditions or Safety Concerns.





Weight Bearing


Right Lower Extremity:  Right


Weight Bearing/Tolerated


Left Lower Extremity:  Left


Weight Bearing/Tolerated





Exercises


Supine Ex:  Ankle pumps, Quad Set, Glut sets, Short Arc Quads


Supine Reps:  15





Treatments


Pt completed supine LE exercises, and refused OOB activity due to LE pain.





Assessment


Current Status:  Fair Progress


Pt with increased pain this visit which limited OOB activity. Pt requires 

assistance with SAQ exercises and reports severe pain with all exercises. Will 

continue to progress activity as pt tolerates.





PT Long Term Goals


Long Term Goals


PT Long Term Goals Time Frame:  Jul 25, 2020


Roll Left & Right (QC):  4


Sit to Lying (QC):  4


Lying-Sitting on Side/Bed(QC):  4


Sit to Stand (QC):  5


Chair/Bed-to-Chair Xfer(QC):  5


Toilet Transfer (QC):  5


Does the Patient Walk:  Yes


Walk 10 feet (QC):  5





PT Plan


Problem List


Problem List:  Activity Tolerance, Functional Strength, Safety, Balance, Gait, 

Transfer, Bed Mobility





Treatment/Plan


Treatment Plan:  Continue Plan of Care


Treatment Plan:  Bed Mobility, Education, Functional Activity [place], Functional 

Strength, Gait, Safety, Therapeutic Exercise, Transfers


Treatment Duration:  Jul 25, 2020


Frequency:  6 times per week


Estimated Hrs Per Day:  .25 hour per day


Patient and/or Family Agrees t:  Yes





Time/GCodes


Time In:  835


Time Out:  848


Total Billed Treatment Time:  13


Total Billed Treatment


1 visit 


1 EX (13 min)











SHENA ALEJANDRO PT                 Jul 15, 2020 09:47

## 2020-07-15 NOTE — DISCHARGE INST-SIMPLE/STANDARD
Discharge Inst-Standard


Reconcile Patient Problems


Problems Reviewed?:  Yes





Discharge Medications


New, Converted or Re-Newed RX:  Transmitted to Pharmacy





Patient Instructions/Follow Up


Plan of Care/Instructions/FU:  


Please continue to take your medications as written. Please follow up with


your primary care doctor within the n ext week to follow up this hospital


stay.


Activity as Tolerated:  Yes


Discharge Diet:  Low Sodium Diet


Return to The Hospital For:  


Chest pain, shortness of breath, fever, worsening pain, if you feel you


are getting worse.











ROBERT VILLALTA MD              Jul 15, 2020 09:34

## 2020-07-15 NOTE — DISCHARGE SUMMARY
Diagnosis/Chief Complaint


Date of Admission


2020 at 10:01


Date of Discharge





Admission Diagnosis


Cellulitis


Primary Care





Discharge Diagnosis





(1) Lymphedema


Status:  Chronic


(2) Morbid obesity


(3) Hypoxia


Status:  Acute


(4) Hypothyroidism


(5) Cellulitis and abscess of leg


Status:  Acute


(6) ERIC (acute kidney injury)


(7) Long term current use of anticoagulants with INR goal of 2.0-3.0





Discharge Summary


Discharge Physical Exam


Allergies:  


Coded Allergies:  


     diphenhydramine (Verified  Allergy, Unknown, 20)


   


   RESTLESS LEGSSD


Vitals & I&Os





Vital Signs








  Date Time  Temp Pulse Resp B/P (MAP) Pulse Ox O2 Delivery O2 Flow Rate FiO2


 


7/15/20 07:47 37.0 86 20 114/55 (74) 91 Nasal Cannula 3.00 


 


20 12:24        36











Hospital Course


Labs (last 24 hrs)


Laboratory Tests


20 10:42: Glucometer 128H


20 13:10: Coronavirus (COVID-19)(PCR) Negative


20 15:31: Glucometer 130H


20 18:27: Vancomycin Level Trough 32.0*H


20 20:32: Glucometer 152H


7/15/20 05:50: Glucometer 119H


7/15/20 06:02: Vancomycin Level Trough 21.4H





Microbiology


20 Blood Culture - Preliminary, Resulted


          No growth


Patient resulted labs reviewed.


Pending Labs


Laboratory Tests


7/15/20 05:50: Glucometer 119


7/15/20 06:02: Vancomycin Level Trough 21.4








Discussion & Recommendations


Discharge Planning:  >30 minutes discharge planning





Discharge


Home Medications:





Active Scripts


Active


Reported


Fentanyl Patch 75MCG (Fentanyl) 1 Each Patch.td72 75 Mcg TD Q72H


Protonix (Pantoprazole Sodium) 40 Mg Tablet.dr 40 Mg PO DAILY


Potassium Chloride 10 Meq Tab.er.prt 20 Meq PO DAILY


     TAKE TWO TABLETS


Nystatin 15 Gm Oint...g. 1 Applic TP BID


Endocet 7.5-325 mg Tablet (Oxycodone HCl/Acetaminophen) 1 Each Tablet 1 Each PO 

Q6H PRN


Mirapex (Pramipexole Di-HCl) 1 Mg Tablet 1 Mg PO TID


Metolazone 2.5 Mg Tablet 2.5 Mg PO DAILY


Metformin HCl 500 Mg Tablet 500 Mg PO DAILY


Levothyroxine Sodium 50 Mcg Tablet 50 Mcg PO DAILY


Ibuprofen 200 Mg Capsule 200 Mg PO Q6H PRN


Diclofenac Sodium 100 Gm Gel..gram. 1 Applic TD QID PRN


     APPLY 4 GRAMS TO EACH KNEE NOT TO EXCEED 32 GRAMS PER DAY


Warfarin Sodium 5 Mg Tablet 5 Mg PO SUN MON WED FRI SAT


     TAKE ONE TABLET IN THE EVENING OF EVERY , MONDAY, WEDNESDAY,


     FRIDAY AND SATURDAY FOR DVT


Warfarin Sodium 10 Mg Tablet 10 Mg PO TUESDAY AND THURSDAY


     TAKE ONE TABLET IN THE EVENING OF EVERY TUESDAY AND WEDNESDAY


Baclofen 10 Mg Tablet 10 Mg PO QID


Lasix (Furosemide) 40 Mg Tablet 40 Mg PO DAILY


Cymbalta (Duloxetine HCl) 30 Mg Capsule.dr 30 Mg PO DAILY


Allopurinol 100 Mg Tablet 100 Mg PO BID


Cipro (Ciprofloxacin HCl) 500 Mg Tablet 500 Mg PO BID


     HAS A 14 DAY SUPPLY. FILLED ON 2020.





Instructions to patient/family


Please see electronic discharge instructions given to patient.





Clinical Quality Measures


DVT/VTE Risk/Contraindication:


Risk Factor Score Per Nursin


RFS Level Per Nursing on Admit:  4+=Very High





Problem Qualifiers





(1) Hypothyroidism:  


Hypothyroidism type:  unspecified  Qualified Codes:  E03.9 - Hypothyroidism, 

unspecified








ROBERT VILLALTA MD              Jul 15, 2020 09:32

## 2020-07-15 NOTE — NUR
CM/SS finalized discharge. 



Plan: Patient is to discharge to [facility], [place], operated by [facility] at 1:00 p.m. with new 
oxygen. 



CHEY/SS contacted [facility] to inform her of patient qualifying for 3L NC continuos. She verbalized 
understanding and stated she will have them bring over a portable tank. CM/SS faxed 
finalized discharge, oxygen script, o2 sats, and additional clinical. CHEY/SS informed the 
patients primary care nurse of planned discharge time. No further needs at this time.